# Patient Record
Sex: FEMALE | Race: OTHER | Employment: STUDENT | URBAN - METROPOLITAN AREA
[De-identification: names, ages, dates, MRNs, and addresses within clinical notes are randomized per-mention and may not be internally consistent; named-entity substitution may affect disease eponyms.]

---
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
## 2025-04-17 ENCOUNTER — TELEPHONE (OUTPATIENT)
Dept: FAMILY MEDICINE CLINIC | Age: 20
End: 2025-04-17

## 2025-04-17 NOTE — TELEPHONE ENCOUNTER
----- Message from Jennifer TA sent at 4/16/2025  3:41 PM EDT -----  Regarding: [facility] Appointment Request  [facility] Appointment Request    Patient needs appointment for [facility] Appointment Type: New to Provider.     Patient Requested Dates(s): As soon as possible   Patient Requested Time: Afternoon  Provider Name:  Jocelyn Dominguez MD    Reason for Appointment Request: New Patient - No appointments available during search.  new to provider, transferring from: Ela Xavier, SEGUNDO  CNP and she had a  appetite issue, headache for 2 to 3 weeks.   --------------------------------------------------------------------------------------------------------------------------    Relationship to Patient: Faviola Woods     Call Back Information: OK to leave message on voicemail  Preferred Call Back Number: Phone [phone]/ [phone]

## 2025-05-19 ENCOUNTER — OFFICE VISIT (OUTPATIENT)
Age: 20
End: 2025-05-19

## 2025-05-19 VITALS
HEIGHT: 62 IN | SYSTOLIC BLOOD PRESSURE: 91 MMHG | BODY MASS INDEX: 19.05 KG/M2 | WEIGHT: 103.5 LBS | DIASTOLIC BLOOD PRESSURE: 59 MMHG | TEMPERATURE: 97.8 F | HEART RATE: 85 BPM

## 2025-05-19 DIAGNOSIS — S39.012A LUMBAR STRAIN, INITIAL ENCOUNTER: ICD-10-CM

## 2025-05-19 DIAGNOSIS — K21.00 GASTROESOPHAGEAL REFLUX DISEASE WITH ESOPHAGITIS WITHOUT HEMORRHAGE: Primary | ICD-10-CM

## 2025-05-19 RX ORDER — OMEPRAZOLE 20 MG/1
20 CAPSULE, DELAYED RELEASE ORAL
Qty: 90 CAPSULE | Refills: 1 | Status: SHIPPED | OUTPATIENT
Start: 2025-05-19

## 2025-05-19 RX ORDER — ACETAMINOPHEN 500 MG
500 TABLET ORAL 4 TIMES DAILY PRN
Qty: 120 TABLET | Refills: 5 | Status: SHIPPED | OUTPATIENT
Start: 2025-05-19

## 2025-05-19 ASSESSMENT — ENCOUNTER SYMPTOMS
CONSTIPATION: 0
ANAL BLEEDING: 0
DIARRHEA: 0
COUGH: 0
ABDOMINAL PAIN: 1
WHEEZING: 0
BACK PAIN: 1
VOMITING: 1
BLOOD IN STOOL: 0
CHEST TIGHTNESS: 0
ABDOMINAL DISTENTION: 0
RECTAL PAIN: 0
NAUSEA: 0
SHORTNESS OF BREATH: 0

## 2025-05-19 NOTE — PROGRESS NOTES
2025     Luís Escamilla (:  2005) is a 19 y.o. female, here for evaluation of the following medical concerns:    Chief Complaint   Patient presents with    Abdominal Pain     C/o burning and discomfort in lower chest/abdomen area and vomiting when eating spicy foods X 1 month      Burning in upper abdomen for about one month upon waking.  Is better later in the day.  Worsens when eating spicy foods.  Has vomiting one time after eating spicy foods.  Denies dark, black or tarry stools.  Denies urinary symptoms or diarrhea/constipation.      Having intermittent lumbar pain for several months.  Has not needed any type of medication for this.  Denies injury.  Denies loss of bowel/bladder control or saddle anesthesia.      Review of Systems   Constitutional:  Negative for chills, diaphoresis, fatigue and fever.   Respiratory:  Negative for cough, chest tightness, shortness of breath and wheezing.    Cardiovascular:  Negative for chest pain and palpitations.   Gastrointestinal:  Positive for abdominal pain and vomiting. Negative for abdominal distention, anal bleeding, blood in stool, constipation, diarrhea, nausea and rectal pain.   Genitourinary: Negative.    Musculoskeletal:  Positive for back pain.   Neurological:  Negative for dizziness, weakness and headaches.       Prior to Visit Medications    Medication Sig Taking? Authorizing Provider   omeprazole (PRILOSEC) 20 MG delayed release capsule Take 1 capsule by mouth every morning (before breakfast) Yes Ela Xavier APRN - CNP   acetaminophen (TYLENOL) 500 MG tablet Take 1 tablet by mouth 4 times daily as needed for Pain Yes Ela Xavier APRN - CNP        Social History     Tobacco Use    Smoking status: Never    Smokeless tobacco: Never   Vaping Use    Vaping status: Never Used   Substance Use Topics    Alcohol use: Never    Drug use: Never        Vitals:    25 0820   BP: (!) 91/59   BP Site: Left Upper Arm   Patient Position: Sitting   BP